# Patient Record
Sex: MALE | Race: WHITE | Employment: FULL TIME | URBAN - METROPOLITAN AREA
[De-identification: names, ages, dates, MRNs, and addresses within clinical notes are randomized per-mention and may not be internally consistent; named-entity substitution may affect disease eponyms.]

---

## 2018-01-01 ENCOUNTER — HOSPITAL ENCOUNTER (EMERGENCY)
Age: 53
End: 2018-09-22
Attending: EMERGENCY MEDICINE
Payer: COMMERCIAL

## 2018-01-01 VITALS — WEIGHT: 145 LBS | BODY MASS INDEX: 23.4 KG/M2 | SYSTOLIC BLOOD PRESSURE: 197 MMHG | DIASTOLIC BLOOD PRESSURE: 75 MMHG

## 2018-01-01 DIAGNOSIS — I46.9 CARDIAC ARREST (HCC): Primary | ICD-10-CM

## 2018-01-01 PROCEDURE — 2500000003 HC RX 250 WO HCPCS

## 2018-01-01 PROCEDURE — 99285 EMERGENCY DEPT VISIT HI MDM: CPT

## 2018-01-01 PROCEDURE — 6360000002 HC RX W HCPCS

## 2018-09-22 NOTE — ED NOTES
[facility] called per writer  Ref # [id number]       Per resident Jignesh Bansal, he spoke with  via telephone and stated that patient is a coroners case until further notice.     Wife Juan Mclaughlin and other family remains present in ER  Wife's # 820 [facility], [address] [place] [address]  09/22/18 1990

## 2018-09-22 NOTE — FLOWSHEET NOTE
[facility]   Patient Death Note  DEATH   Shift date: 2018   Shift day: Saturday   Shift # 2                 Room #    Name: Jesse Yun            Age: 46 y.o. Gender: male          Yazdanism: Non-Faith      Place of Methodist: unknown  Admit Date & Time: 2018  3:25 PM     Referral:  Multi-discipline   Actual date of death: 2018 15:38       SITUATION AT DEATH:  Patient came into the ED in cardiac arrest.  Despite attempts, patient could not be revived. IS THIS A 'S CASE? Yes    SPIRITUAL/EMOTIONAL INTERVENTION:   met with family. Wife Margaux Perez was present along with sister-in-law and grand-daughter.  prayed with the family while patient was being cared for. After death,  facilitated meeting between family and doctors. Escorted family back to view the patient and cared for the family while grieving. Delivered grief packet and parental handbook for helping children grieve. DOCTOR SIGNING DEATH NOTE:        spoke with [facility], who will contact the  home  (Please note which nurse you spoke with to confirm the  home will be contacted. Family should not be listed)    Copy of COMPLETED Release of Body Form Received? Yes       HOME:  Name: [address] [place] [address] Ave: [facility]  Phone Number: [phone]    NEXT OF KIN:  Name: Margaux Perez  Relationship: Wife  Street Address: [address] [place] [address]. City: [place]  Zip code: [address]  Phone Number: [address]? No    IF SO, WHAT? None    Electronically signed by Lyndsay Sr Resident, on 2018 at 4:27 PM.  [facility]  [phone]       18 1625   Encounter Summary   Services provided to: Patient; Family   Referral/Consult From: Multi-disciplinary team   Support System Spouse; Family members   Continue Visiting

## 2018-09-22 NOTE — ED NOTES
Bed: 13  Expected date:   Expected time:   Means of arrival:   Comments:     Milad Michael RN  09/22/18 2067

## 2018-09-22 NOTE — ED PROVIDER NOTES
[facility]  ED  Emergency Department Encounter  Emergency Medicine Resident     Pt Name: Thao Clayton  MRN: [id number]  [place] 1965  Date of evaluation: 9/22/18  PCP:  Juve Cuevas MD    [address] [place] [address]       Chief Complaint   Patient presents with    Cardiac Arrest       HISTORY OF PRESENT ILLNESS  (Location/Symptom, Timing/Onset, Context/Setting, Quality, Duration, Modifying Factors, Severity.)      Thao Clayton is a 46 y.o. male who presents In cardiac arrest.  Patient was found down by his wife who initiated CPR. There was an unknown downtime. EMS was called and noted the patient was still warm and had freshly urinated. Initial rhythm per EMS was asystole. After 1 round of epinephrine and CPR, patient had V. fib. EMS performed approximately 50 minutes of CPR prior to arrival.  During that time, patient had been on multiple different rhythms. With no pulse. Multiple doses of epinephrine were given as well as magnesium, calcium, and bicarb. Per family, patient has not seen a physician in years, but has a history of COPD, hypertension, and hyperlipidemia. PAST MEDICAL / SURGICAL / SOCIAL / FAMILY HISTORY      has a past medical history of COPD (chronic obstructive pulmonary disease) ([facility] [address] Erectile dysfunction; Hyperlipidemia; Hypertension; and Tobacco abuse.     has a past surgical history that includes Ankle surgery. Social History     Social History    Marital status:      Spouse name: N/A    Number of children: N/A    Years of education: N/A     Occupational History    Not on file.      Social History Main Topics    Smoking status: Current Every Day Smoker     Packs/day: 2.00     Years: 30.00     Types: Cigarettes    Smokeless tobacco: Never Used    Alcohol use 18.0 oz/week     30 Cans of beer per week      Comment: daily beer    Drug use: No    Sexual activity: Yes     Partners: Female     Other Topics Concern    Not on file     Social History Narrative    No narrative on file       Family History   Problem Relation Age of Onset    High Blood Pressure Mother     Seizures Mother     High Cholesterol Mother     Diabetes Father     High Blood Pressure Father     Heart Disease Father     Cancer Father         prostate    High Blood Pressure Maternal Grandmother     Heart Disease Maternal Grandmother     High Cholesterol Maternal Grandmother     COPD Maternal Grandmother     Heart Disease Maternal Grandfather     Heart Disease Paternal Grandmother     Cancer Paternal Grandmother        Allergies:  Patient has no known allergies. Home Medications:  Prior to Admission medications    Medication Sig Start Date End Date Taking? Authorizing Provider   CIALIS 20 MG tablet TAKE 1 TABLET AS NEEDED FOR ERECTILE DYSFUNCTION (NO REFILL UNTIL 10/11/14) 1/1/15   Rochelle Gonzalez MD   traMADol (ULTRAM) 50 MG tablet Take 50 mg by mouth every 6 hours as needed. Historical Provider, MD   permethrin (ELIMITE) 5 % cream Apply topically as directed 7/2/13   Sherman Hsu MD   meloxicam (MOBIC) 15 MG tablet Take 1 tablet by mouth daily. 2/28/13   Samir Ly DO   aspirin 81 MG tablet Take 81 mg by mouth daily. Historical Provider, MD   ibuprofen (ADVIL;MOTRIN) 200 MG CAPS Take 1 capsule by mouth. Historical Provider, MD       REVIEW OF SYSTEMS    (2-9 systems for level 4, 10 or more for level 5)      Review of Systems  Unable to be obtained due to medical condition  PHYSICAL EXAM   (up to 7 for level 4, 8 or more for level 5)      INITIAL VITALS:   BP (!) 197/75   Wt 145 lb (65.8 kg)   BMI 23.40 kg/m²     Physical Exam   Constitutional:   Patient was unresponsive. HENT:   Head: Normocephalic and atraumatic. Eyes:   Pupils were 6 mm and unreactive bilaterally. Neck:   C collar is in place   Cardiovascular:   Patient was pulseless   Pulmonary/Chest:   Patient is intubated on arrival with bilateral breath sounds.    Musculoskeletal: He Laith Kahn MD  Resident  09/22/18 6968